# Patient Record
(demographics unavailable — no encounter records)

---

## 2024-10-24 NOTE — IMAGING
[de-identified] : Left wrist Volar/ulnar area of erythema and swelling measuring approximately 4cm x 3cm proximal to the wrist crease, warm, TTP No palpable collection, fluctuance or wound Painful wrist and finger ROM +AIN/ PIN/ Ulnar n SILT throughout fingers wwp  3 views left wrist and 3 views left hand: No acute fractures or malalignment, no cortical irregularities

## 2024-10-24 NOTE — HISTORY OF PRESENT ILLNESS
[9] : 9 [Localized] : localized [Throbbing] : throbbing [Constant] : constant [Meds] : meds [Full time] : Work status: full time [de-identified] : 10/23/2024 OPHELIA CAMPO is a 49 year old male here today for: Location: Left Hand  Complaint: The patient presents to the office today for evaluation of left volar/ulnar wrist pain and swelling.  He notes roughly 4 days of increasing pain, swelling and erythema about the ulnar/volar wrist.  Symptoms acutely worsened in the last 24 hours.  He cannot recall any injuries but does note "cold" roughly 3 weeks ago.  He can recall similar symptoms beginning roughly 6 months ago which have waxed and waned. Symptom onset: 1 week Prior treatments: brace  Hand Dominance: Left  Occupation:   PMH: Denies Allergies: None    [FreeTextEntry1] : Left Hand [FreeTextEntry6] : tender, spasm

## 2024-10-24 NOTE — DISCUSSION/SUMMARY
[de-identified] : - discussed the likely etiology/ pathophysiology of the patient's complaints today in layman's terms - his physical exam is consistent with an infectious process, we will obtain a stat MRI to evaluate for drainable fluid collection and start him on oral antibiotics - reviewed treatment options, conservative and operative as well as the indications for each - reviewed risks, benefits and alternatives to these - discussed conservative treatment options including the role for anti-inflammatory medications, antibiotics and bracing - Keflex Rx provided today - brace for comfort - NSAIDs prn pain - f/u after MRI - reviewed signs and symptoms which should prompt repeat urgent evaluation and the patient voiced understanding   My cumulative time spent on this patient's visit included: Preparation for the visit, review of the medical records, review of pertinent diagnostic studies, examination and counseling of the patient on the above diagnosis, treatment plan and prognosis, orders of diagnostic tests, medications and/or appropriate procedures and documentation in the medical records of today's visit.

## 2024-10-24 NOTE — DISCUSSION/SUMMARY
[de-identified] : - discussed the likely etiology/ pathophysiology of the patient's complaints today in layman's terms - his physical exam is consistent with an infectious process, we will obtain a stat MRI to evaluate for drainable fluid collection and start him on oral antibiotics - reviewed treatment options, conservative and operative as well as the indications for each - reviewed risks, benefits and alternatives to these - discussed conservative treatment options including the role for anti-inflammatory medications, antibiotics and bracing - Keflex Rx provided today - brace for comfort - NSAIDs prn pain - f/u after MRI - reviewed signs and symptoms which should prompt repeat urgent evaluation and the patient voiced understanding   My cumulative time spent on this patient's visit included: Preparation for the visit, review of the medical records, review of pertinent diagnostic studies, examination and counseling of the patient on the above diagnosis, treatment plan and prognosis, orders of diagnostic tests, medications and/or appropriate procedures and documentation in the medical records of today's visit.

## 2024-10-24 NOTE — IMAGING
[de-identified] : Left wrist Volar/ulnar area of erythema and swelling measuring approximately 4cm x 3cm proximal to the wrist crease, warm, TTP No palpable collection, fluctuance or wound Painful wrist and finger ROM +AIN/ PIN/ Ulnar n SILT throughout fingers wwp  3 views left wrist and 3 views left hand: No acute fractures or malalignment, no cortical irregularities

## 2024-10-24 NOTE — HISTORY OF PRESENT ILLNESS
[9] : 9 [Localized] : localized [Throbbing] : throbbing [Constant] : constant [Meds] : meds [Full time] : Work status: full time [de-identified] : 10/23/2024 OPHELIA CAMPO is a 49 year old male here today for: Location: Left Hand  Complaint: The patient presents to the office today for evaluation of left volar/ulnar wrist pain and swelling.  He notes roughly 4 days of increasing pain, swelling and erythema about the ulnar/volar wrist.  Symptoms acutely worsened in the last 24 hours.  He cannot recall any injuries but does note "cold" roughly 3 weeks ago.  He can recall similar symptoms beginning roughly 6 months ago which have waxed and waned. Symptom onset: 1 week Prior treatments: brace  Hand Dominance: Left  Occupation:   PMH: Denies Allergies: None    [FreeTextEntry1] : Left Hand [FreeTextEntry6] : tender, spasm

## 2024-10-30 NOTE — DISCUSSION/SUMMARY
[de-identified] : - discussed the likely etiology/ pathophysiology of the patient's complaints today in layman's terms - MRI images and interpretation reviewed, reactive edema and mild degenerative changes - reviewed treatment options, conservative and operative as well as the indications for each - reviewed risks, benefits and alternatives to these - discussed conservative treatment options including the role for anti-inflammatory medications, antibiotics and bracing - activities as tolerated - NSAIDs prn pain - f/u as needed - reviewed signs and symptoms which should prompt repeat urgent evaluation and the patient voiced understanding   My cumulative time spent on this patient's visit included: Preparation for the visit, review of the medical records, review of pertinent diagnostic studies, examination and counseling of the patient on the above diagnosis, treatment plan and prognosis, orders of diagnostic tests, medications and/or appropriate procedures and documentation in the medical records of today's visit.

## 2024-10-30 NOTE — IMAGING
[de-identified] : Left wrist No erythema, swelling, ecchymosis or wounds  No evidence of active infection Full symmetric wrist ROM  Able to make a painless composite fist +AIN/ PIN/ Ulnar n SILT throughout fingers wwp  3 views left wrist and 3 views left hand: No acute fractures or malalignment, no cortical irregularities MRI L wrist (10/23/24): Volar radial ganglion.  Thinning with likely pinhole tear central disc triangular fibrocartilage complex.  Scarring and remodeling sigmoid and styloid attachments of triangular fibrocartilage complex.  Frayed partial tear ulnar attachment dorsal distal radioulnar ligament.  Longitudinal split extensor carpi ulnaris tendon.  Flexor carpi ulnar tenosynovitis and muscle tendon junction strain.  Intrasubstance longitudinal partial tearing abductor digiti minimi muscle with muscle strain at and just proximal to the wrist joint with evidence of adjacent prominent volar ulnar soft tissue reactive edema.

## 2024-10-30 NOTE — HISTORY OF PRESENT ILLNESS
[9] : 9 [Localized] : localized [Throbbing] : throbbing [Constant] : constant [Meds] : meds [Full time] : Work status: full time [de-identified] : 10/30/24: The patient returns to the office today for repeat evaluation of his left ulnar wrist/hand pain and swelling.  He took a few days of the oral antibiotic and anti-inflammatory with complete resolution of his symptoms after about 3 days.  He has absolutely no complaints today.  He has an MRI available for review.   10/23/2024 OPHELIA CAMPO is a 49 year old male here today for: Location: Left Hand  Complaint: The patient presents to the office today for evaluation of left volar/ulnar wrist pain and swelling.  He notes roughly 4 days of increasing pain, swelling and erythema about the ulnar/volar wrist.  Symptoms acutely worsened in the last 24 hours.  He cannot recall any injuries but does note "cold" roughly 3 weeks ago.  He can recall similar symptoms beginning roughly 6 months ago which have waxed and waned. Symptom onset: 1 week Prior treatments: brace  Hand Dominance: Left  Occupation:   PMH: Raynaud's syndrome Allergies: None    [FreeTextEntry1] : Left Hand [FreeTextEntry6] : tender, spasm